# Patient Record
Sex: FEMALE | Race: WHITE
[De-identification: names, ages, dates, MRNs, and addresses within clinical notes are randomized per-mention and may not be internally consistent; named-entity substitution may affect disease eponyms.]

---

## 2017-02-28 ENCOUNTER — HOSPITAL ENCOUNTER (EMERGENCY)
Dept: HOSPITAL 47 - EC | Age: 27
Discharge: HOME | End: 2017-02-28
Payer: MEDICAID

## 2017-02-28 VITALS
SYSTOLIC BLOOD PRESSURE: 110 MMHG | HEART RATE: 95 BPM | RESPIRATION RATE: 20 BRPM | DIASTOLIC BLOOD PRESSURE: 72 MMHG | TEMPERATURE: 98 F

## 2017-02-28 DIAGNOSIS — F17.200: ICD-10-CM

## 2017-02-28 DIAGNOSIS — S16.1XXA: Primary | ICD-10-CM

## 2017-02-28 DIAGNOSIS — M43.6: ICD-10-CM
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
## 2017-02-28 DIAGNOSIS — Z79.899: ICD-10-CM

## 2017-02-28 PROCEDURE — 96372 THER/PROPH/DIAG INJ SC/IM: CPT

## 2017-02-28 PROCEDURE — 99283 EMERGENCY DEPT VISIT LOW MDM: CPT

## 2017-02-28 NOTE — ED
General Adult HPI





- General


Chief complaint: Neck Pain/Injury


Stated complaint: Neck pain


Time Seen by Provider: 02/28/17 12:11


Source: patient, RN notes reviewed, old records reviewed


Mode of arrival: ambulatory


Limitations: no limitations





- History of Present Illness


Initial comments: 





This is a 26-year-old female the ER for evaluation.  Patient comes in the ER 

for evaluation of neck pain.  Patient awoke with right-sided neck pain earlier 

today.  Patient has no medical history no chance of pregnancy.  No trauma.  No 

fevers or recent drugs or alcohol.  No sore throat.  Patient just complains of 

difficulty turning her neck neck pain.  No mass or history of similar symptoms





- Related Data


 Home Medications











 Medication  Instructions  Recorded  Confirmed


 


Prenatal Vit-Iron-Folic Acid 1 tab PO DAILY 08/03/15 08/03/15





[Prenatal-U Capsule]   











 Allergies











Allergy/AdvReac Type Severity Reaction Status Date / Time


 


No Known Allergies Allergy   Verified 02/28/17 11:38














Review of Systems


ROS Statement: 


Those systems with pertinent positive or pertinent negative responses have been 

documented in the HPI.





ROS Other: All systems not noted in ROS Statement are negative.





Past Medical History


Past Medical History: No Reported History


History of Any Multi-Drug Resistant Organisms: None Reported


Past Surgical History: Orthopedic Surgery


Additional Past Surgical History / Comment(s): Left foot surger 2013


Past Anesthesia/Blood Transfusion Reactions: No Reported Reaction


Past Psychological History: No Psychological Hx Reported


Smoking Status: Current every day smoker


Past Alcohol Use History: None Reported


Past Drug Use History: Marijuana





- Past Family History


  ** Father


Family Medical History: No Reported History





General Exam





- General Exam Comments


Initial Comments: 





Mild right torticollis with decreased range of motion to the right with 

tenderness along cervical mastoid, no palpable lymph nodes


Limitations: no limitations


General appearance: alert, in no apparent distress


Head exam: Present: atraumatic, normocephalic, normal inspection


Eye exam: Present: normal appearance, PERRL, EOMI.  Absent: scleral icterus, 

conjunctival injection, periorbital swelling


ENT exam: Present: normal exam, mucous membranes moist


Neck exam: Present: normal inspection.  Absent: tenderness, meningismus, 

lymphadenopathy


Respiratory exam: Present: normal lung sounds bilaterally.  Absent: respiratory 

distress, wheezes, rales, rhonchi, stridor


Cardiovascular Exam: Present: regular rate, normal rhythm, normal heart sounds.

  Absent: systolic murmur, diastolic murmur, rubs, gallop, clicks


GI/Abdominal exam: Present: soft, normal bowel sounds.  Absent: distended, 

tenderness, guarding, rebound, rigid


Extremities exam: Present: normal inspection, full ROM, normal capillary 

refill.  Absent: tenderness, pedal edema, joint swelling, calf tenderness


Back exam: Present: normal inspection


Neurological exam: Present: alert, oriented X3, CN II-XII intact


Psychiatric exam: Present: normal affect, normal mood


Skin exam: Present: warm, dry, intact, normal color.  Absent: rash





Course





 Vital Signs











  02/28/17





  11:36


 


Temperature 98.0 F


 


Pulse Rate 95


 


Respiratory 20





Rate 


 


Blood Pressure 110/72


 


O2 Sat by Pulse 100





Oximetry 














- Reevaluation(s)


Reevaluation #1: 





02/28/17 12:50


Patient's symptoms are resolved





Medical Decision Making





- Medical Decision Making





22 for female here with right-sided torticollis, patient which she reportedly 

with medication, encouraged heat and discharged home





Disposition


Clinical Impression: 


 Strain of neck muscle, Torticollis





Disposition: HOME SELF-CARE


Condition: Good


Instructions:  Spasmodic Torticollis (ED)


Referrals: 


Jaswinder Henderson DO [Primary Care Provider] - 1-2 days

## 2018-06-05 ENCOUNTER — HOSPITAL ENCOUNTER (EMERGENCY)
Dept: HOSPITAL 47 - EC | Age: 28
Discharge: HOME | End: 2018-06-05
Payer: MEDICAID

## 2018-06-05 VITALS
TEMPERATURE: 98 F | HEART RATE: 99 BPM | RESPIRATION RATE: 18 BRPM | DIASTOLIC BLOOD PRESSURE: 73 MMHG | SYSTOLIC BLOOD PRESSURE: 105 MMHG

## 2018-06-05 DIAGNOSIS — F17.200: ICD-10-CM

## 2018-06-05 DIAGNOSIS — A60.04: Primary | ICD-10-CM

## 2018-06-05 LAB
PH UR: 6.5 [PH] (ref 5–8)
RBC UR QL: 1 /HPF (ref 0–5)
SP GR UR: 1 (ref 1–1.03)
SQUAMOUS UR QL AUTO: 1 /HPF (ref 0–4)
UROBILINOGEN UR QL STRIP: <2 MG/DL (ref ?–2)
WBC #/AREA URNS HPF: 1 /HPF (ref 0–5)

## 2018-06-05 PROCEDURE — 87591 N.GONORRHOEAE DNA AMP PROB: CPT

## 2018-06-05 PROCEDURE — 87070 CULTURE OTHR SPECIMN AEROBIC: CPT

## 2018-06-05 PROCEDURE — 87808 TRICHOMONAS ASSAY W/OPTIC: CPT

## 2018-06-05 PROCEDURE — 81001 URINALYSIS AUTO W/SCOPE: CPT

## 2018-06-05 PROCEDURE — 81025 URINE PREGNANCY TEST: CPT

## 2018-06-05 PROCEDURE — 87205 SMEAR GRAM STAIN: CPT

## 2018-06-05 PROCEDURE — 87491 CHLMYD TRACH DNA AMP PROBE: CPT

## 2018-06-05 PROCEDURE — 99284 EMERGENCY DEPT VISIT MOD MDM: CPT

## 2018-06-05 NOTE — ED
Female Urogenital HPI





- General


Chief complaint: Vaginal Bleeding


Stated complaint: Female 


Time Seen by Provider: 18 19:32


Source: patient, RN notes reviewed, old records reviewed


Mode of arrival: ambulatory


Limitations: no limitations





- History of Present Illness


Initial comments: 





This patient is a 27 year old female, with rashover labia majora an minora. 

Patient reports that irritation on the skin as well as some minor bleeding. She 

also reports pain in the ball movement. Patient did have a bowel movement she 

noticed some blood in the toilet. PAtietn states she has been with one partner 

for a year and never had these symptoms before. 


MD Complaint: possible STD


Onset/Timin


-: days(s)


Location: labia, perineum


Radiation: non-radiating


Severity: severe


Severity scale (1-10): 8


Quality: burning


Consistency: constant


Last Menstrual Period: 05/15/18


Patient Pregnant: No





- Related Data


Sexually active: Yes


 Previous Rx's











 Medication  Instructions  Recorded


 


Acetaminophen-Codeine 300-30mg 1 tab PO Q4H PRN #10 tablet 18





[Tylenol w/codeine #3]  


 


Acyclovir [Zovirax] 400 mg PO TID 10 Days 18


 


Docusate [Colace] 100 mg PO DAILY #10 capsule 18











 Allergies











Allergy/AdvReac Type Severity Reaction Status Date / Time


 


No Known Allergies Allergy   Verified 18 19:35














Review of Systems


ROS Statement: 


Those systems with pertinent positive or pertinent negative responses have been 

documented in the HPI.





ROS Other: All systems not noted in ROS Statement are negative.





Past Medical History


Past Medical History: No Reported History


History of Any Multi-Drug Resistant Organisms: None Reported


Past Surgical History: Orthopedic Surgery


Additional Past Surgical History / Comment(s): Left foot surger 2013


Past Anesthesia/Blood Transfusion Reactions: No Reported Reaction


Past Psychological History: No Psychological Hx Reported


Smoking Status: Current some day smoker


Past Alcohol Use History: None Reported


Past Drug Use History: Marijuana





- Past Family History


  ** Father


Family Medical History: No Reported History





General Exam





- General Exam Comments


Initial Comments: 





This patient is a 27 year old female, moderate discomfort. 


Limitations: no limitations


General appearance: alert, in no apparent distress


Head exam: Present: atraumatic, normocephalic, normal inspection


Eye exam: Present: normal appearance, PERRL, EOMI.  Absent: scleral icterus, 

conjunctival injection, periorbital swelling


ENT exam: Present: normal exam, mucous membranes moist


Neck exam: Present: normal inspection.  Absent: tenderness, meningismus, 

lymphadenopathy


Respiratory exam: Present: normal lung sounds bilaterally.  Absent: respiratory 

distress, wheezes, rales, rhonchi, stridor


Cardiovascular Exam: Present: regular rate, normal rhythm, normal heart sounds.

  Absent: systolic murmur, diastolic murmur, rubs, gallop, clicks


External exam: Present: erythema, lesions (blistered painful lesion consistent 

with herpes. ).  Absent: normal external exam


Speculum exam: Present: vaginal discharge.  Absent: normal speculum exam, 

erythema


By manual exam: Present: normal by manual exam.  Absent: cervical motion 

tenderness, adnexal tenderness


Extremities exam: Present: normal inspection, full ROM, normal capillary 

refill.  Absent: tenderness, pedal edema, joint swelling, calf tenderness


Neurological exam: Present: alert


Psychiatric exam: Present: normal affect, normal mood


Skin exam: Present: warm, dry, intact, normal color.  Absent: rash





Course


 Vital Signs











  18





  19:27


 


Temperature 98.0 F


 


Pulse Rate 99


 


Respiratory 18





Rate 


 


Blood Pressure 105/73


 


O2 Sat by Pulse 99





Oximetry 














Medical Decision Making





- Medical Decision Making





This patient is a 27 year old female with CC of apin with having bowel movement 

and vulva rash. No evidence of exernal hemorrhoids. She does have some external 

lesions near rectum, and over labia consistent with herpes. Discussed inform 

sexual partners and abstain from sex. Discussed starting on anti viral meds, 

return parameters discussed. Will also start on stool softner as patient 

reports pain with bowel movement. 





- Lab Data


 Lab Results











  18 Range/Units





  20:26 20:26 20:44 


 


Urine Color   Colorless   


 


Urine Appearance   Clear   (Clear)  


 


Urine pH   6.5   (5.0-8.0)  


 


Ur Specific Gravity   1.005   (1.001-1.035)  


 


Urine Protein   Negative   (Negative)  


 


Urine Glucose (UA)   Negative   (Negative)  


 


Urine Ketones   Negative   (Negative)  


 


Urine Blood   Negative   (Negative)  


 


Urine Nitrite   Negative   (Negative)  


 


Urine Bilirubin   Negative   (Negative)  


 


Urine Urobilinogen   <2.0   (<2.0)  mg/dL


 


Ur Leukocyte Esterase   Trace H   (Negative)  


 


Urine RBC   1   (0-5)  /hpf


 


Urine WBC   1   (0-5)  /hpf


 


Ur Squamous Epith Cells   1   (0-4)  /hpf


 


Urine Bacteria   Rare H   (None)  /hpf


 


Urine Mucus   Rare H   (None)  /hpf


 


Urine HCG, Qual  Not Detected    (Not Detectd)  


 


Chlamydia Source    Vagina  


 


Chlamydia DNA (PCR)    Negative  (Neg,Equiv)  


 


N. gonorrhoeae Source    Vagina  


 


N.gonorrhoeae DNA Probe    Negative  (Neg,Equiv)  


 


Trichomonas Ag (Rapid)     (Negative)  














  18 Range/Units





  20:45 


 


Urine Color   


 


Urine Appearance   (Clear)  


 


Urine pH   (5.0-8.0)  


 


Ur Specific Gravity   (1.001-1.035)  


 


Urine Protein   (Negative)  


 


Urine Glucose (UA)   (Negative)  


 


Urine Ketones   (Negative)  


 


Urine Blood   (Negative)  


 


Urine Nitrite   (Negative)  


 


Urine Bilirubin   (Negative)  


 


Urine Urobilinogen   (<2.0)  mg/dL


 


Ur Leukocyte Esterase   (Negative)  


 


Urine RBC   (0-5)  /hpf


 


Urine WBC   (0-5)  /hpf


 


Ur Squamous Epith Cells   (0-4)  /hpf


 


Urine Bacteria   (None)  /hpf


 


Urine Mucus   (None)  /hpf


 


Urine HCG, Qual   (Not Detectd)  


 


Chlamydia Source   


 


Chlamydia DNA (PCR)   (Neg,Equiv)  


 


N. gonorrhoeae Source   


 


N.gonorrhoeae DNA Probe   (Neg,Equiv)  


 


Trichomonas Ag (Rapid)  Negative  (Negative)  














Disposition


Clinical Impression: 


 Herpes





Disposition: HOME SELF-CARE


Condition: Good


Instructions:  Genital Herpes Simplex (ED)


Additional Instructions: 


Patient advised to take the medication as prescribed.  Follow-up with primary 

care provider.  Return to the emergency department if any alarming signs or 

symptoms occur.


Prescriptions: 


Acetaminophen-Codeine 300-30mg [Tylenol w/codeine #3] 1 tab PO Q4H PRN #10 

tablet


 PRN Reason: Pain


Acyclovir [Zovirax] 400 mg PO TID 10 Days


Docusate [Colace] 100 mg PO DAILY #10 capsule


Is patient prescribed a controlled substance at d/c from ED?: Yes


When asked, does pt state using other controlled substances?: No


If prescribed controlled substance>3 days was MAPS reviewed?: Prescribed <3 Days


If opioid is for acute pain is fill amount 7 days or less?: Yes


If Rx opioid, was Start Talking consent form obtained?: Yes


Referrals: 


Jaswinder Henderson DO [Primary Care Provider] - 1-2 days


Time of Disposition: 20:46

## 2021-12-06 ENCOUNTER — HOSPITAL ENCOUNTER (EMERGENCY)
Dept: HOSPITAL 47 - EC | Age: 31
Discharge: HOME | End: 2021-12-06
Payer: COMMERCIAL

## 2021-12-06 VITALS
RESPIRATION RATE: 16 BRPM | DIASTOLIC BLOOD PRESSURE: 77 MMHG | TEMPERATURE: 98.9 F | SYSTOLIC BLOOD PRESSURE: 115 MMHG | HEART RATE: 66 BPM

## 2021-12-06 DIAGNOSIS — F12.90: ICD-10-CM

## 2021-12-06 DIAGNOSIS — U07.1: Primary | ICD-10-CM

## 2021-12-06 DIAGNOSIS — F17.200: ICD-10-CM

## 2021-12-06 LAB
ALBUMIN SERPL-MCNC: 3.8 G/DL (ref 3.5–5)
ALP SERPL-CCNC: 56 U/L (ref 38–126)
ALT SERPL-CCNC: 17 U/L (ref 4–34)
AMYLASE SERPL-CCNC: 96 U/L (ref 30–110)
ANION GAP SERPL CALC-SCNC: 6 MMOL/L
AST SERPL-CCNC: 29 U/L (ref 14–36)
BASOPHILS # BLD AUTO: 0 K/UL (ref 0–0.2)
BASOPHILS NFR BLD AUTO: 0 %
BUN SERPL-SCNC: 14 MG/DL (ref 7–17)
CALCIUM SPEC-MCNC: 8.7 MG/DL (ref 8.4–10.2)
CHLORIDE SERPL-SCNC: 106 MMOL/L (ref 98–107)
CO2 SERPL-SCNC: 25 MMOL/L (ref 22–30)
EOSINOPHIL # BLD AUTO: 0 K/UL (ref 0–0.7)
EOSINOPHIL NFR BLD AUTO: 1 %
ERYTHROCYTE [DISTWIDTH] IN BLOOD BY AUTOMATED COUNT: 3.73 M/UL (ref 3.8–5.4)
ERYTHROCYTE [DISTWIDTH] IN BLOOD: 12.9 % (ref 11.5–15.5)
GLUCOSE SERPL-MCNC: 83 MG/DL (ref 74–99)
HCT VFR BLD AUTO: 35.3 % (ref 34–46)
HGB BLD-MCNC: 12.2 GM/DL (ref 11.4–16)
LIPASE SERPL-CCNC: 194 U/L (ref 23–300)
LYMPHOCYTES # SPEC AUTO: 0.6 K/UL (ref 1–4.8)
LYMPHOCYTES NFR SPEC AUTO: 31 %
MCH RBC QN AUTO: 32.6 PG (ref 25–35)
MCHC RBC AUTO-ENTMCNC: 34.5 G/DL (ref 31–37)
MCV RBC AUTO: 94.7 FL (ref 80–100)
MONOCYTES # BLD AUTO: 0.2 K/UL (ref 0–1)
MONOCYTES NFR BLD AUTO: 11 %
NEUTROPHILS # BLD AUTO: 1.1 K/UL (ref 1.3–7.7)
NEUTROPHILS NFR BLD AUTO: 55 %
PH UR: 5.5 [PH] (ref 5–8)
PLATELET # BLD AUTO: 128 K/UL (ref 150–450)
POTASSIUM SERPL-SCNC: 4.1 MMOL/L (ref 3.5–5.1)
PROT SERPL-MCNC: 6.5 G/DL (ref 6.3–8.2)
SODIUM SERPL-SCNC: 137 MMOL/L (ref 137–145)
SP GR UR: 1.01 (ref 1–1.03)
UROBILINOGEN UR QL STRIP: <2 MG/DL (ref ?–2)
WBC # BLD AUTO: 2.1 K/UL (ref 3.8–10.6)

## 2021-12-06 PROCEDURE — 74177 CT ABD & PELVIS W/CONTRAST: CPT

## 2021-12-06 PROCEDURE — 96375 TX/PRO/DX INJ NEW DRUG ADDON: CPT

## 2021-12-06 PROCEDURE — 82150 ASSAY OF AMYLASE: CPT

## 2021-12-06 PROCEDURE — 87635 SARS-COV-2 COVID-19 AMP PRB: CPT

## 2021-12-06 PROCEDURE — 96374 THER/PROPH/DIAG INJ IV PUSH: CPT

## 2021-12-06 PROCEDURE — 80053 COMPREHEN METABOLIC PANEL: CPT

## 2021-12-06 PROCEDURE — 81025 URINE PREGNANCY TEST: CPT

## 2021-12-06 PROCEDURE — 81003 URINALYSIS AUTO W/O SCOPE: CPT

## 2021-12-06 PROCEDURE — 99284 EMERGENCY DEPT VISIT MOD MDM: CPT

## 2021-12-06 PROCEDURE — 36415 COLL VENOUS BLD VENIPUNCTURE: CPT

## 2021-12-06 PROCEDURE — 85025 COMPLETE CBC W/AUTO DIFF WBC: CPT

## 2021-12-06 PROCEDURE — 83605 ASSAY OF LACTIC ACID: CPT

## 2021-12-06 PROCEDURE — 83690 ASSAY OF LIPASE: CPT

## 2021-12-06 NOTE — ED
Abdominal Pain HPI





- General


Chief Complaint: Abdominal Pain


Stated Complaint: Rt lower abd pain


Time Seen by Provider: 12/06/21 11:09


Source: patient, family, RN notes reviewed


Mode of arrival: ambulatory


Limitations: no limitations





- History of Present Illness


Initial Comments: 





Patient is a 31-year-old female that presents to the emergency department 

complaining of right lower abdominal pain.  She notes this been going on for the

past few days.  She notes that it comes and goes.  She denied any alleviating or

aggravating factors.  Patient was otherwise well-appearing no apparent distress.

 She denied any chance of pregnancy.  She notes she is about sternal muscle 

psych.  She denied chest pain shortness of breath headache nausea vomiting 

diarrhea constipation fever fatigue chills.





- Related Data


                                Home Medications











 Medication  Instructions  Recorded  Confirmed


 


No Known Home Medications  12/06/21 12/06/21











                                    Allergies











Allergy/AdvReac Type Severity Reaction Status Date / Time


 


No Known Allergies Allergy   Verified 12/06/21 13:34














Review of Systems


ROS Statement: 


Those systems with pertinent positive or pertinent negative responses have been 

documented in the HPI.





ROS Other: All systems not noted in ROS Statement are negative.





Past Medical History


Past Medical History: No Reported History


History of Any Multi-Drug Resistant Organisms: None Reported


Past Surgical History: Orthopedic Surgery


Additional Past Surgical History / Comment(s): Left foot surger 2013


Past Anesthesia/Blood Transfusion Reactions: No Reported Reaction


Past Psychological History: No Psychological Hx Reported


Smoking Status: Current every day smoker


Past Alcohol Use History: None Reported


Past Drug Use History: Marijuana





- Past Family History


  ** Father


Family Medical History: No Reported History





General Exam


Limitations: no limitations


General appearance: alert, in no apparent distress


Head exam: Present: atraumatic, normocephalic, normal inspection


Eye exam: Present: normal appearance, PERRL, EOMI.  Absent: scleral icterus, 

conjunctival injection, periorbital swelling


ENT exam: Present: normal exam, mucous membranes moist


Neck exam: Present: normal inspection


Respiratory exam: Present: normal lung sounds bilaterally.  Absent: respiratory 

distress, wheezes, rales, rhonchi, stridor


Cardiovascular Exam: Present: regular rate, normal rhythm, normal heart sounds. 

Absent: systolic murmur, diastolic murmur, rubs, gallop, clicks


GI/Abdominal exam: Present: soft, normal bowel sounds.  Absent: distended, 

tenderness, guarding, rebound, rigid


Extremities exam: Present: normal inspection, full ROM, normal capillary refill.

 Absent: tenderness, pedal edema, joint swelling, calf tenderness


Neurological exam: Present: alert, oriented X3


Psychiatric exam: Present: normal affect, normal mood


Skin exam: Present: warm, dry, intact, normal color.  Absent: rash





Course





                                   Vital Signs











  12/06/21





  10:55


 


Temperature 98.4 F


 


Pulse Rate 91


 


Respiratory 18





Rate 


 


Blood Pressure 117/84


 


O2 Sat by Pulse 100





Oximetry 














Medical Decision Making





- Medical Decision Making





31-year-old female complaining of right lower quadrant pain.


Labs, 1 L normal saline, 2 mg of morphine, 4 mg Zofran, CT of the abdomen and 

pelvis ordered.


Labs: CBC shows low lymphocytes, CMP and urinalysis unremarkable.


Due to low lymphocytes Covid test ordered.


Covid test ordered.  Patient wishes to undergo monoclonal antibody infusion.


Computed tomography scan shows no acute abdominal process, a right sided ovarian

cyst.


Case discussed with Dr. Rivas, patient can discharge home after infusion.





- Lab Data


Result diagrams: 


                                 12/06/21 12:11





                                 12/06/21 12:11





                                   Lab Results











  12/06/21 12/06/21 12/06/21 Range/Units





  12:11 12:11 12:11 


 


WBC  2.1 L    (3.8-10.6)  k/uL


 


RBC  3.73 L    (3.80-5.40)  m/uL


 


Hgb  12.2    (11.4-16.0)  gm/dL


 


Hct  35.3    (34.0-46.0)  %


 


MCV  94.7    (80.0-100.0)  fL


 


MCH  32.6    (25.0-35.0)  pg


 


MCHC  34.5    (31.0-37.0)  g/dL


 


RDW  12.9    (11.5-15.5)  %


 


Plt Count  128 L    (150-450)  k/uL


 


MPV  7.8    


 


Neutrophils %  55    %


 


Lymphocytes %  31    %


 


Monocytes %  11    %


 


Eosinophils %  1    %


 


Basophils %  0    %


 


Neutrophils #  1.1 L    (1.3-7.7)  k/uL


 


Lymphocytes #  0.6 L    (1.0-4.8)  k/uL


 


Monocytes #  0.2    (0-1.0)  k/uL


 


Eosinophils #  0.0    (0-0.7)  k/uL


 


Basophils #  0.0    (0-0.2)  k/uL


 


Sodium     (137-145)  mmol/L


 


Potassium     (3.5-5.1)  mmol/L


 


Chloride     ()  mmol/L


 


Carbon Dioxide     (22-30)  mmol/L


 


Anion Gap     mmol/L


 


BUN     (7-17)  mg/dL


 


Creatinine     (0.52-1.04)  mg/dL


 


Est GFR (CKD-EPI)AfAm     (>60 ml/min/1.73 sqM)  


 


Est GFR (CKD-EPI)NonAf     (>60 ml/min/1.73 sqM)  


 


Glucose     (74-99)  mg/dL


 


Plasma Lactic Acid Bairon     (0.7-2.0)  mmol/L


 


Calcium     (8.4-10.2)  mg/dL


 


Total Bilirubin     (0.2-1.3)  mg/dL


 


AST     (14-36)  U/L


 


ALT     (4-34)  U/L


 


Alkaline Phosphatase     ()  U/L


 


Total Protein     (6.3-8.2)  g/dL


 


Albumin     (3.5-5.0)  g/dL


 


Amylase     ()  U/L


 


Lipase     ()  U/L


 


Urine Color   Light Yellow   


 


Urine Appearance   Clear   (Clear)  


 


Urine pH   5.5   (5.0-8.0)  


 


Ur Specific Gravity   1.007   (1.001-1.035)  


 


Urine Protein   Negative   (Negative)  


 


Urine Glucose (UA)   Negative   (Negative)  


 


Urine Ketones   Negative   (Negative)  


 


Urine Blood   Negative   (Negative)  


 


Urine Nitrite   Negative   (Negative)  


 


Urine Bilirubin   Negative   (Negative)  


 


Urine Urobilinogen   <2.0   (<2.0)  mg/dL


 


Ur Leukocyte Esterase   Negative   (Negative)  


 


Urine HCG, Qual    Not Detected  (Not Detectd)  


 


Coronavirus (PCR)     (Not Detectd)  














  12/06/21 12/06/21 12/06/21 Range/Units





  12:11 12:11 12:46 


 


WBC     (3.8-10.6)  k/uL


 


RBC     (3.80-5.40)  m/uL


 


Hgb     (11.4-16.0)  gm/dL


 


Hct     (34.0-46.0)  %


 


MCV     (80.0-100.0)  fL


 


MCH     (25.0-35.0)  pg


 


MCHC     (31.0-37.0)  g/dL


 


RDW     (11.5-15.5)  %


 


Plt Count     (150-450)  k/uL


 


MPV     


 


Neutrophils %     %


 


Lymphocytes %     %


 


Monocytes %     %


 


Eosinophils %     %


 


Basophils %     %


 


Neutrophils #     (1.3-7.7)  k/uL


 


Lymphocytes #     (1.0-4.8)  k/uL


 


Monocytes #     (0-1.0)  k/uL


 


Eosinophils #     (0-0.7)  k/uL


 


Basophils #     (0-0.2)  k/uL


 


Sodium  137    (137-145)  mmol/L


 


Potassium  4.1    (3.5-5.1)  mmol/L


 


Chloride  106    ()  mmol/L


 


Carbon Dioxide  25    (22-30)  mmol/L


 


Anion Gap  6    mmol/L


 


BUN  14    (7-17)  mg/dL


 


Creatinine  0.96    (0.52-1.04)  mg/dL


 


Est GFR (CKD-EPI)AfAm  >90    (>60 ml/min/1.73 sqM)  


 


Est GFR (CKD-EPI)NonAf  80    (>60 ml/min/1.73 sqM)  


 


Glucose  83    (74-99)  mg/dL


 


Plasma Lactic Acid Bairon   <0.5 L   (0.7-2.0)  mmol/L


 


Calcium  8.7    (8.4-10.2)  mg/dL


 


Total Bilirubin  0.3    (0.2-1.3)  mg/dL


 


AST  29    (14-36)  U/L


 


ALT  17    (4-34)  U/L


 


Alkaline Phosphatase  56    ()  U/L


 


Total Protein  6.5    (6.3-8.2)  g/dL


 


Albumin  3.8    (3.5-5.0)  g/dL


 


Amylase  96    ()  U/L


 


Lipase  194    ()  U/L


 


Urine Color     


 


Urine Appearance     (Clear)  


 


Urine pH     (5.0-8.0)  


 


Ur Specific Gravity     (1.001-1.035)  


 


Urine Protein     (Negative)  


 


Urine Glucose (UA)     (Negative)  


 


Urine Ketones     (Negative)  


 


Urine Blood     (Negative)  


 


Urine Nitrite     (Negative)  


 


Urine Bilirubin     (Negative)  


 


Urine Urobilinogen     (<2.0)  mg/dL


 


Ur Leukocyte Esterase     (Negative)  


 


Urine HCG, Qual     (Not Detectd)  


 


Coronavirus (PCR)    Detected A  (Not Detectd)  














- Radiology Data


Radiology results: report reviewed, image reviewed





CT of the abdomen and pelvis: Moderate amount of free fluid in the pelvic 

cul-de-sac there is a 5 cm non-simple cyst or cystic lesion right pelvis 

presumed ovarian etiology.





Disposition


Clinical Impression: 


 COVID





Disposition: HOME SELF-CARE


Condition: Stable


Instructions (If sedation given, give patient instructions):  Coronavirus Diseas

e 2019 (COVID-19)


Additional Instructions: 


Please return to the Emergency Department if symptoms worsen or any other 

concerns.


Is patient prescribed a controlled substance at d/c from ED?: No


Referrals: 


None,Stated [Primary Care Provider] - 1-2 days


Time of Disposition: 14:13

## 2021-12-06 NOTE — CT
EXAMINATION TYPE: CT abdomen pelvis w con

 

DATE OF EXAM: 12/6/2021

 

HISTORY: RUQ pain

 

CT DLP: 443.6mGycm  Automated Exposure Control for Dose Reduction was Utilized.

 

CONTRAST: 

CT scan of the abdomen and pelvis is performed without oral but with IV Contrast, patient injected wi
th 100 mL of Isovue 300.

 

COMPARISON: None.

 

FINDINGS:

 

LUNG BASES: No significant abnormality is appreciated.

 

LIVER/GB: Liver measures upper limits of normal in size. There is nonspecific mild to moderate peripo
rtal edema. No intrahepatic or extra hepatic biliary dilatation. No concerning focal liver masses.

 

PANCREAS: No significant abnormality is seen.

 

SPLEEN: No significant abnormality is seen.

 

ADRENALS: No significant abnormality is seen.

 

KIDNEYS: Lobulated but otherwise simple-appearing thin-walled 2.4 cm cyst upper pole level left kidne
y delayed axial image 17. Symmetric cortical uptake and excretion . Mild bilateral pyelocaliectasis w
ithout hydroureter

 

BOWEL:  No suspicious small or large bowel dilatation. Suboptimal evaluation as patient has little in
tra-abdominal fat and there is lack of enteric contrast.

 

UTERUS/ADNEXA: Anteverted uterus. Moderate amount of free fluid in pelvic cul-de-sac. Heterogeneous s
lightly prominent left ovary axial image 58 with scattered follicles suspected. Right ovary has a thi
n-walled cyst or cystic lesion measuring 5.0 x 4.3 x 4.4 cm axial image 57 and coronal image 49. Ther
e is more dependent density. No solid nodular component clearly seen. Occasional scattered tiny calci
fied pelvic phleboliths noted.

 

LYMPH NODES: No greater than 1cm abdominal or pelvic lymph nodes are appreciated.

 

OSSEOUS STRUCTURES: No significant abnormality is seen.

 

OTHER: No significant additional abnormality is seen.

 

IMPRESSION: Moderate amount of free fluid in pelvic cul-de-sac. There is 5.0 cm nonsimple cyst or cys
tic lesion right pelvis presumed ovarian in etiology. Consider further investigation with pelvic ultr
asound to further evaluate and characterize.

## 2022-09-14 ENCOUNTER — HOSPITAL ENCOUNTER (EMERGENCY)
Dept: HOSPITAL 47 - EC | Age: 32
Discharge: HOME | End: 2022-09-14
Payer: COMMERCIAL

## 2022-09-14 VITALS
TEMPERATURE: 98 F | HEART RATE: 110 BPM | RESPIRATION RATE: 18 BRPM | SYSTOLIC BLOOD PRESSURE: 128 MMHG | DIASTOLIC BLOOD PRESSURE: 87 MMHG

## 2022-09-14 DIAGNOSIS — Z20.822: ICD-10-CM

## 2022-09-14 DIAGNOSIS — J02.9: Primary | ICD-10-CM

## 2022-09-14 DIAGNOSIS — R53.83: ICD-10-CM

## 2022-09-14 DIAGNOSIS — F17.200: ICD-10-CM

## 2022-09-14 PROCEDURE — 36415 COLL VENOUS BLD VENIPUNCTURE: CPT

## 2022-09-14 PROCEDURE — 99283 EMERGENCY DEPT VISIT LOW MDM: CPT

## 2022-09-14 PROCEDURE — 87430 STREP A AG IA: CPT

## 2022-09-14 PROCEDURE — 87081 CULTURE SCREEN ONLY: CPT

## 2022-09-14 PROCEDURE — 86308 HETEROPHILE ANTIBODY SCREEN: CPT

## 2022-09-14 PROCEDURE — 87635 SARS-COV-2 COVID-19 AMP PRB: CPT

## 2022-09-14 NOTE — ED
ENT HPI





- General


Chief complaint: ENT


Stated complaint: sore throat


Time Seen by Provider: 09/14/22 13:39


Source: patient


Mode of arrival: ambulatory


Limitations: no limitations





- History of Present Illness


Initial comments: 


Patient is a 32-year-old female presents to the emergency department with a 

chief complaint of sore throat.  Patient states she was diagnosed with strep 

throat this past May, August, and September.  Patient feels that her infection 

is coming back.  Patient finished Augmentin 2 weeks ago.  States her throat 

began hurting yesterday similar to previous strep infections.  Reports fatigue 

and does admit to nasal congestion and drainage.  Denies fever, chills, 

headache, cough, shortness of breath, chest pain.








- Related Data


                                  Previous Rx's











 Medication  Instructions  Recorded


 


Penicillin V Potassium [Pen Vee K] 500 mg PO BID #20 tablet 09/14/22











                                    Allergies











Allergy/AdvReac Type Severity Reaction Status Date / Time


 


No Known Allergies Allergy   Verified 09/14/22 12:44














Review of Systems


ROS Statement: 


Those systems with pertinent positive or pertinent negative responses have been 

documented in the HPI.





ROS Other: All systems not noted in ROS Statement are negative.





Past Medical History


Past Medical History: No Reported History


History of Any Multi-Drug Resistant Organisms: None Reported


Past Surgical History: Orthopedic Surgery


Additional Past Surgical History / Comment(s): Left foot surger 2013


Past Anesthesia/Blood Transfusion Reactions: No Reported Reaction


Past Psychological History: No Psychological Hx Reported


Smoking Status: Current every day smoker


Past Alcohol Use History: None Reported


Past Drug Use History: Marijuana





- Past Family History


  ** Father


Family Medical History: No Reported History





General Exam


Limitations: no limitations


General appearance: alert, in no apparent distress


Head exam: Present: atraumatic, normocephalic, normal inspection


ENT exam: Absent: normal oropharynx (Erythematous throat with mildly edematous 

tonsils.  No exudate)


Cardiovascular Exam: Present: regular rate, normal rhythm, normal heart sounds. 

Absent: systolic murmur, diastolic murmur, rubs, gallop, clicks


GI/Abdominal exam: Present: soft, normal bowel sounds.  Absent: distended, 

tenderness, guarding, rebound, rigid


Neurological exam: Present: alert, oriented X3, CN II-XII intact


Psychiatric exam: Present: normal affect, normal mood


Skin exam: Present: warm, dry, intact, normal color.  Absent: rash





Course


                                   Vital Signs











  09/14/22





  12:42


 


Temperature 98 F


 


Pulse Rate 110 H


 


Respiratory 18





Rate 


 


Blood Pressure 128/87


 


O2 Sat by Pulse 97





Oximetry 














Medical Decision Making





- Medical Decision Making


This is a 32-year-old female presenting with sore throat.  The pharynx is 

erythematous with mildly edematous bilateral tonsils.  There is no exudate.  The

tonsils do not reflect typical tonsils of strep throat or mono.








COVID-19, strep throat, and mono are not detected.  Patient is very adamant that

this feels like a strep infection however does admit to postnasal drip.  Patient

and I discussed treatment in detail.  She will attempt postnasal drip treatment 

over-the-counter for the next few days if no improvement she will start 

antibiotic treatment that I will prescribe her.  She verbalizes understanding.








Dr. Levin is my attending.





- Lab Data


                                   Lab Results











  09/14/22 09/14/22 09/14/22 Range/Units





  12:46 14:01 14:08 


 


Coronavirus (PCR)  Not Detected    (Not Detectd)  


 


Heterophile Antibody   Negative   (Negative)  


 


Group A Strep Rapid    Negative  (Negative)  














Disposition


Clinical Impression: 


 Sore throat, Fatigue





Disposition: HOME SELF-CARE


Condition: Good


Instructions (If sedation given, give patient instructions):  Strep Throat (ED),

Postnasal Drip (DC)


Additional Instructions: 


Please take Zyrtec or Claritin over-the-counter and Benadryl at night for the 

next 3 days.  Your sore throat may be due to a postnasal drip which these 

medications will help.  If symptoms continue or get worse she may start 

antibiotics.  Follow-up with primary care provider in one to 2 days.  Return to 

the emergency department if you experience new, concerning, or worsening 

symptoms.


Prescriptions: 


Penicillin V Potassium [Pen Vee K] 500 mg PO BID #20 tablet


Is patient prescribed a controlled substance at d/c from ED?: No


Referrals: 


None,Stated [Primary Care Provider] - 1-2 days


Time of Disposition: 15:18